# Patient Record
Sex: MALE | Race: AMERICAN INDIAN OR ALASKA NATIVE
[De-identification: names, ages, dates, MRNs, and addresses within clinical notes are randomized per-mention and may not be internally consistent; named-entity substitution may affect disease eponyms.]

---

## 2021-12-15 ENCOUNTER — HOSPITAL ENCOUNTER (EMERGENCY)
Dept: HOSPITAL 5 - ED | Age: 56
Discharge: HOME | End: 2021-12-15
Payer: COMMERCIAL

## 2021-12-15 VITALS — DIASTOLIC BLOOD PRESSURE: 81 MMHG | SYSTOLIC BLOOD PRESSURE: 154 MMHG

## 2021-12-15 DIAGNOSIS — Z79.899: ICD-10-CM
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
## 2021-12-15 DIAGNOSIS — I16.0: ICD-10-CM

## 2021-12-15 DIAGNOSIS — S82.491A: Primary | ICD-10-CM

## 2021-12-15 DIAGNOSIS — S82.391A: ICD-10-CM
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
## 2021-12-15 PROCEDURE — 99284 EMERGENCY DEPT VISIT MOD MDM: CPT

## 2021-12-15 PROCEDURE — 96374 THER/PROPH/DIAG INJ IV PUSH: CPT

## 2021-12-15 PROCEDURE — 96375 TX/PRO/DX INJ NEW DRUG ADDON: CPT

## 2021-12-15 PROCEDURE — 73590 X-RAY EXAM OF LOWER LEG: CPT

## 2021-12-15 NOTE — CONSULTATION
History of Present Illness





- HPI


History of present illness: 





ORTHO  CONSULT 





Assessment  : 1. Closed distal tibia fracture, right leg 


                    2.  Proximal  fibular fracture consistent with possible 

Maisonneuve injury, RIGHT leg








Recommendations: 





1.  Close reduction and application of short leg fiberglass cast under IV 

conscious sedation


2.  Toe-touch weightbearing only with crutches;


3.  Appropriate pain management;


4.  Follow-up in the orthopedic office in 5 days 








Discussion: This is a 56-year-old gentleman who had the misfortune of sustaining

a peculiar twisting fall during the morning hours of date of admission 

(12/15/2021).  Was unable to bear weight and was brought to the emergency room 

with a rotational deformity of the right lower leg and pain and swelling in the 

lower one third of the leg.  No other injuries.  Was awake and alert and 

tolerating the pain well.


His examination shows a obvious deformity in the lower one third of the leg with

the foot externally rotated.  No skin lesion or laceration.  No ecchymoses.  The

foot is neurovascularly intact.  He is actually able to do a straight leg raise 

and lift the leg up off the bed on his own without terrible pain.


X-rays: Radiographs show a spiral fracture at the junction of the middle and 

distal one third with no comminution with the lower or distal fragment being 

displaced 1 full diameter laterally.  There is also a small nondisplaced 

fracture in the proximal fibular neck (Maisonneuve).





Procedure: Discussing both forms of treatment the with one being surgery in the 

form of an IM nail versus close reduction and application of a short leg 

fiberglass cast, the patient chose the latter form of treatment.  He then was 

given a small dose of fentanyl IV to help with the fracture pain followed by a 

total of 4 mg of Versed given incrementally.  This provided perfect IV conscious

sedation and analgesia for a closed reduction with application of a short leg 

fiberglass cast  molded well and allowed to cure.  He tolerated the procedure 

well and there were no complications.








Medications and Allergies


                                    Allergies











Allergy/AdvReac Type Severity Reaction Status Date / Time


 


No Known Allergies Allergy   Verified 12/15/21 08:29











                                Home Medications











 Medication  Instructions  Recorded  Confirmed  Last Taken  Type


 


Amlodipine Besylate [Norvasc] 5 mg PO DAILY #30 tablet 12/15/21  Unknown Rx


 


HYDROcodone/APAP 5-325 [[place] 1 each PO Q6HR PRN #20 tablet 12/15/21  Unknown Rx





5/325]     


 


Ibuprofen [Motrin 600 MG tab] 600 mg PO Q8H PRN #20 tablet 12/15/21  Unknown Rx











Active Meds: 


Active Medications





Midazolam HCl (Midazolam 2 Mg/2 Ml Inj)  2 mg IV ONCE@1300 ISA


   Stop: 12/15/21 16:00

## 2021-12-15 NOTE — EMERGENCY DEPARTMENT REPORT
<JUAREZ TUCKER - Last Filed: 12/15/21 12:15>





ED General Adult HPI





- General


Chief complaint: Extremity Injury, Lower


Stated complaint: BROKEN LEG


Time Seen by Provider: 12/15/21 08:58


Source: patient


Mode of arrival: Wheelchair


Limitations: No Limitations





- History of Present Illness


Initial comments: 





56-year-old -American male patient presents with complaints of right 

lower leg pain and swelling after a slip and fall injury yesterday.  He rates 

his current pain is a 5/10 in severity.  He states he is unable to bear weight 

on the leg due to the pain.  NKDA per patient.  He denies any numbness/tingling 

or weakness in his leg.  He does admit to some redness in the area





- Related Data


                                  Previous Rx's











 Medication  Instructions  Recorded  Last Taken  Type


 


Amlodipine Besylate [Norvasc] 5 mg PO DAILY #30 tablet 12/15/21 Unknown Rx


 


HYDROcodone/APAP 5-325 [[place] 1 each PO Q6HR PRN #20 tablet 12/15/21 Unknown Rx





5/325]    


 


Ibuprofen [Motrin 600 MG tab] 600 mg PO Q8H PRN #20 tablet 12/15/21 Unknown Rx











                                    Allergies











Allergy/AdvReac Type Severity Reaction Status Date / Time


 


No Known Allergies Allergy   Verified 12/15/21 08:29














ED Review of Systems


Constitutional: denies: chills, diaphoresis, fever, malaise, weakness


Musculoskeletal: joint swelling, arthralgia


Skin: change in color


Neurological: abnormal gait.  denies: numbness, paresthesias





ED Past Medical Hx





- Medications


Home Medications: 


                                Home Medications











 Medication  Instructions  Recorded  Confirmed  Last Taken  Type


 


Amlodipine Besylate [Norvasc] 5 mg PO DAILY #30 tablet 12/15/21  Unknown Rx


 


HYDROcodone/APAP 5-325 [[place] 1 each PO Q6HR PRN #20 tablet 12/15/21  Unknown Rx





5/325]     


 


Ibuprofen [Motrin 600 MG tab] 600 mg PO Q8H PRN #20 tablet 12/15/21  Unknown Rx














ED Physical Exam





- General


Limitations: No Limitations


General appearance: alert, in no apparent distress





- Head


Head exam: Present: atraumatic, normocephalic





- Eye


Eye exam: Present: normal appearance





- Respiratory


Respiratory exam: Absent: respiratory distress





- Cardiovascular


Cardiovascular Exam: Present: regular rate





- Extremities Exam


Extremities exam: Present: other (Tenderness to palpation noted to distal tibia 

with mild erythema and swelling noted; normal perfusion and sensation noted of 

the ankle and foot; no tenderness to palpation noted of the ankle)





- Neurological Exam


Neurological exam: Present: alert, oriented X3





- Psychiatric


Psychiatric exam: Present: normal affect, normal mood





- Skin


Skin exam: Present: warm, dry, intact.  Absent: rash





ED Medical Decision Making





- Medical Decision Making








56-year-old -American male patient presents with complaints of right 

lower leg pain and swelling after a slip and fall injury yesterday.  He rates 

his current pain is a 5/10 in severity.  He states he is unable to bear weight 

on the leg due to the pain.  NKDA per patient.  He denies any numbness/tingling 

or weakness in his leg.  He does admit to some redness in the area





Blood pressure noted to be significantly elevated.  Patient states history of 

hypertension and states he has not been taking his hydrochlorothiazide for about

8 months.  He denies any neurological symptoms and is neurologically intact.





ED Disposition


Clinical Impression: 


 Hypertensive urgency





Tibia/fibula fracture, shaft


Qualifiers:


 Encounter type: initial encounter Fracture type: closed Laterality: right 

Qualified Code(s): S82.201A - Unspecified fracture of shaft of right tibia, 

initial encounter for closed fracture; S82.401A - Unspecified fracture of shaft 

of right fibula, initial encounter for closed fracture





Disposition: 01 HOME / SELF CARE / HOMELESS


Condition: Stable


Instructions:  Cast or Splint Care, Adult, Easy-to-Read, Tibial and Fibular 

Fractures


Prescriptions: 


Ibuprofen [Motrin 600 MG tab] 600 mg PO Q8H PRN #20 tablet


 PRN Reason: Pain


HYDROcodone/APAP 5-325 [[place] 5/325] 1 each PO Q6HR PRN #20 tablet


 PRN Reason: Pain


Amlodipine Besylate [Norvasc] 5 mg PO DAILY #30 tablet


Referrals: 


ORLY BARTON MD [Staff Physician] - 21





<JOSE CONDE - Last Filed: 12/15/21 13:59>





ED Review of Systems


ROS: 


Stated complaint: BROKEN LEG


Other details as noted in HPI








ED Course


                                   Vital Signs











  12/15/21 12/15/21





  08:31 09:49


 


Temperature 98.3 F 


 


Pulse Rate 107 H 78


 


Respiratory 20 





Rate  


 


Blood Pressure 240/144 


 


O2 Sat by Pulse 99 





Oximetry  














- Reevaluation(s)


Reevaluation #1: 





12/15/21 13:40


Dr. Barton has been consulted from orthopedics department.  In speaking with the

patient's patient is opting to not have surgery at this time.  Patient will be 

placed in a cast.  Patient given Versed fentanyl for mild sedation.  Dr. Barton 

has placed cast.  Monitor patient during the moderate sedation.





ED Medical Decision Making





- Radiology Data





[facility]  


                                     [address] [place] [address]  


 


                                            XRay Report   


                                               Signed  


 


Patient: BARBER RAWLS                                                        

       MR#: [id number]  


[id number]          


: 1965                                                                

Acct:[id number]      


 


Age/Sex: 56 / M                                                                

ADM Date: 12/15/21     


 


Loc: ED       


Attending Dr:   


 


 


Ordering Physician: JUAREZ TUCKER  


Date of Service: 12/15/21  


Procedure(s): XR tibia fibula 2V RT  


Accession Number(s): [id number]  


 


cc: JUAREZ TUCKER   


 


Fluoro Time In Minutes:   


 


XR tibia fibula 2V RT  


 


 INDICATION / CLINICAL INFORMATION:  


 distal pain and swelling after injury.  


 


 COMPARISON:  


 None available.  


 


 FINDINGS:  


 BONES/JOINT(S): Mildly displaced fracture of the proximal fibular shaft. 

Moderately displaced 


oblique fracture of the distal tibial shaft with lateral displacement of the 

distal fracture 


fragment.  


 


 SOFT TISSUES: No significant abnormality.  


 


 ADDITIONAL FINDINGS: None.  


 


 


 


 Signer Name: Junior Gardiner MD   


 Signed: 12/15/2021 10:23 AM  


 Workstation Name: [id number]   


 


 


Transcribed By: ESTEE  


Dictated By: Junior Gardiner MD  


Electronically Authenticated By: Junior Gardiner MD    


Signed Date/Time: 12/15/21 1023                             


Critical care attestation.: 


If time is entered above; I have spent that time in minutes in the direct care 

of this critically ill patient, excluding procedure time.








ED Disposition


Is pt being admited?: No


Does the pt Need Aspirin: No


Time of Disposition: 13:56

## 2021-12-15 NOTE — XRAY REPORT
XR tibia fibula 2V RT



INDICATION / CLINICAL INFORMATION:

distal pain and swelling after injury.



COMPARISON:

None available.

 

FINDINGS:

BONES/JOINT(S): Mildly displaced fracture of the proximal fibular shaft. Moderately displaced oblique
 fracture of the distal tibial shaft with lateral displacement of the distal fracture fragment.



SOFT TISSUES: No significant abnormality.



ADDITIONAL FINDINGS: None.







Signer Name: Junior Gardiner MD 

Signed: 12/15/2021 10:23 AM

Workstation Name: [email or web address]

## 2022-03-02 NOTE — XRAY REPORT
RIGHT TIBIA-FIBULA 2 VIEW(S)



INDICATION / CLINICAL INFORMATION: M79.604



COMPARISON: None available.

 

FINDINGS:



BONES / JOINT(S): Healing obliquely oriented fracture of the proximal fibular head and neck and dista
l tibial metaphysis with callus formation about both fracture sites. There is incomplete osseous brid
ging at the fracture sites. There is persistent posterior and slightly medial displacement of the dis
chris tibial fracture fragment in relation to the tibial shaft by approximately 6 mm. No significant ar
thritis.



SOFT TISSUES: No significant abnormality.



ADDITIONAL FINDINGS: None.







Signer Name: Jayden Smith MD 

Signed: 3/2/2022 2:44 PM

Workstation Name: [email or web address]

## 2022-04-22 NOTE — XRAY REPORT
. RIGHT HIP 2 VIEW(S)



INDICATION / CLINICAL INFORMATION: M79.604 PAIN IN RIGHT LEG



COMPARISON: None available.

 

FINDINGS:



BONES / JOINT(S): No acute fracture or subluxation. No significant arthritis.

SOFT TISSUES: No significant abnormality.



ADDITIONAL FINDINGS: None.



IMPRESSION:

1. No acute findings.



Signer Name: John Scruggs MD 

Signed: 4/22/2022 3:49 PM

Workstation Name: [id number]